# Patient Record
Sex: FEMALE | Race: WHITE | NOT HISPANIC OR LATINO | URBAN - METROPOLITAN AREA
[De-identification: names, ages, dates, MRNs, and addresses within clinical notes are randomized per-mention and may not be internally consistent; named-entity substitution may affect disease eponyms.]

---

## 2018-09-19 ENCOUNTER — EMERGENCY (EMERGENCY)
Facility: HOSPITAL | Age: 48
LOS: 0 days | Discharge: HOME | End: 2018-09-19
Admitting: PHYSICIAN ASSISTANT

## 2018-09-19 VITALS
HEART RATE: 85 BPM | SYSTOLIC BLOOD PRESSURE: 196 MMHG | DIASTOLIC BLOOD PRESSURE: 77 MMHG | RESPIRATION RATE: 19 BRPM | TEMPERATURE: 98 F | OXYGEN SATURATION: 97 %

## 2018-09-19 DIAGNOSIS — K08.89 OTHER SPECIFIED DISORDERS OF TEETH AND SUPPORTING STRUCTURES: ICD-10-CM

## 2018-09-19 DIAGNOSIS — K04.7 PERIAPICAL ABSCESS WITHOUT SINUS: ICD-10-CM

## 2018-09-19 NOTE — CONSULT NOTE ADULT - SUBJECTIVE AND OBJECTIVE BOX
Patient presented with daughter.  Patient reports upper left tooth pain and swelling for the past 2 days. She stated she went to her private dentist yesterday who told her she was fine and then went again today and the dentist sent her here for the infection.

## 2018-09-19 NOTE — ED PROVIDER NOTE - PHYSICAL EXAMINATION
CONST: Well appearing in NAD  EYES: PERRL, EOMI, Sclera and conjunctiva clear.   ENT: No nasal discharge. TM's clear B/L without drainage. no erythema or exudates. Uvula midline. There is a tender abscess to left side of anterior hard palate.  NECK: Non-tender, no meningeal signs  CARD: Normal S1 S2; Normal rate and rhythm  RESP: Equal BS B/L, No wheezes, rhonchi or rales. No distress  GI: Soft, non-tender, non-distended.  MS: Normal ROM in all extremities. No midline spinal tenderness.  SKIN: Warm, dry, no acute rashes. Good turgor  NEURO: A&Ox3, No focal deficits. Strength 5/5 with no sensory deficits. Steady gait

## 2018-09-19 NOTE — CONSULT NOTE ADULT - ASSESSMENT
#[id number]  Reviewed medical history. Patient reports 2day history of  tooth pain and swelling of the upper left.   1 periapical radiograph taken. Radiographic interpretation: #11 is previously root canal treated and has periapical radiolucency.  Clinical exam: patient has vestibular swelling above teeth #11-13 with pain localized at tooth #11 with positive responses to palpation and percussion. Vestibular swelling extends to beneath the left eye.   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 Patient agreed to the treatment. Using , the consent form was explained to the patient. Consent was signed, dated, timed. Side/sites were signed, dated, timed.    2 carpules of 2% lidocaine with 1:100,000 epinephrine were administered via buccal infiltration. Incision made with 15 blade. Purulence noted while draining. Peterose drain placed with 3-0 chromic gut sutures. Provided patient with post-operative instructions.     Amoxicillin 500 mg three times per day and ibuprofen 600 mg as needed were prescribed  #[id number]  Reviewed medical history. Patient reports 2day history of  tooth pain and swelling of the upper left.   1 periapical radiograph taken. Radiographic interpretation: #11 is previously root canal treated and has periapical radiolucency.  Clinical exam: patient has vestibular swelling above teeth #11-13 with pain localized at tooth #11 with positive responses to palpation and percussion. Vestibular swelling extends to beneath the left eye.   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 Patient agreed to the treatment. Using , the consent form was explained to the patient. Consent was signed, dated, timed. Side/sites were signed, dated, timed.    2 carpules of 2% lidocaine with 1:100,000 epinephrine were administered via buccal infiltration. Incision made with 15 blade. Purulence noted while draining. Penrose drain placed with 3-0 chromic gut sutures. Provided patient with post-operative instructions.     Amoxicillin 500 mg three times per day and ibuprofen 600 mg as needed were prescribed

## 2019-07-29 NOTE — ED PROVIDER NOTE - NS ED ROS FT
CONST: No fever, chills or bodyaches  EYES: No pain, redness, drainage or visual changes.  ENT: No ear pain or discharge, nasal discharge or congestion. No sore throat  CARD: No chest pain, palpitations  RESP: No SOB, cough, hemoptysis. No hx of asthma or COPD  GI: No abdominal pain, N/V/D  MS: No joint pain, back pain or extremity pain/injury  SKIN: No rashes  NEURO: No headache, dizziness, paresthesias or LOC yesterday

## 2021-09-06 NOTE — ED PROVIDER NOTE - OBJECTIVE STATEMENT
sent by private DDS for evaluation of palatal abscess x 2 days. No fevers or DM
gabapentin 300 mg oral capsule: 1 cap(s) orally every 8 hours  PROzac 20 mg oral capsule: 1 cap(s) orally once a day  tiZANidine 4 mg oral tablet: 1 tab(s) orally 3 times a day  Vyvanse 40 mg oral capsule: 1 cap(s) orally once a day (in the morning)-when pt goes to work  Wellbutrin  mg/24 hours oral tablet, extended release: 1 tab(s) orally every 24 hours  ZyrTEC 10 mg oral tablet: 1 tab(s) orally once a day